# Patient Record
Sex: FEMALE | URBAN - NONMETROPOLITAN AREA
[De-identification: names, ages, dates, MRNs, and addresses within clinical notes are randomized per-mention and may not be internally consistent; named-entity substitution may affect disease eponyms.]

---

## 2024-09-06 ENCOUNTER — OFFICE VISIT (OUTPATIENT)
Dept: URBAN - NONMETROPOLITAN AREA CLINIC 13 | Facility: CLINIC | Age: 74
End: 2024-09-06
Payer: MEDICARE

## 2024-09-06 ENCOUNTER — DASHBOARD ENCOUNTERS (OUTPATIENT)
Age: 74
End: 2024-09-06

## 2024-09-06 VITALS
WEIGHT: 196 LBS | HEIGHT: 64 IN | SYSTOLIC BLOOD PRESSURE: 107 MMHG | BODY MASS INDEX: 33.46 KG/M2 | DIASTOLIC BLOOD PRESSURE: 65 MMHG | HEART RATE: 62 BPM

## 2024-09-06 DIAGNOSIS — R11.0 NAUSEA: ICD-10-CM

## 2024-09-06 DIAGNOSIS — Z86.010 ADENOMAS PERSONAL HISTORY OF COLONIC POLYPS: ICD-10-CM

## 2024-09-06 DIAGNOSIS — K44.9 HIATAL HERNIA: ICD-10-CM

## 2024-09-06 DIAGNOSIS — K57.30 ACQUIRED DIVERTICULOSIS OF COLON: ICD-10-CM

## 2024-09-06 DIAGNOSIS — K21.9 GASTROESOPHAGEAL REFLUX DISEASE, UNSPECIFIED WHETHER ESOPHAGITIS PRESENT: ICD-10-CM

## 2024-09-06 DIAGNOSIS — Z90.49 H/O PARTIAL RESECTION OF COLON: ICD-10-CM

## 2024-09-06 PROCEDURE — 99204 OFFICE O/P NEW MOD 45 MIN: CPT

## 2024-09-06 RX ORDER — LEVOTHYROXINE SODIUM 0.1 MG/1
TAKE 1 TABLET BY MOUTH IN THE MORNING TABLET ORAL
Qty: 90 EACH | Refills: 0 | Status: ACTIVE | COMMUNITY

## 2024-09-06 RX ORDER — MIRABEGRON 50 MG/1
TAKE 1 TABLET BY MOUTH ONCE DAILY FOR URGENCY TABLET, FILM COATED, EXTENDED RELEASE ORAL
Qty: 30 EACH | Refills: 0 | Status: ACTIVE | COMMUNITY

## 2024-09-06 RX ORDER — OXYBUTYNIN CHLORIDE 5 MG/1
TABLET ORAL
Qty: 90 TABLET | Status: ACTIVE | COMMUNITY

## 2024-09-06 RX ORDER — PANTOPRAZOLE SODIUM 40 MG/1
TAKE 1 TABLET BY MOUTH IN THE MORNING TABLET, DELAYED RELEASE ORAL
Qty: 90 EACH | Refills: 0 | Status: ACTIVE | COMMUNITY

## 2024-09-06 RX ORDER — SUCRALFATE 1 G/1
TABLET ORAL
Qty: 180 TABLET | Status: ACTIVE | COMMUNITY

## 2024-09-06 RX ORDER — ESCITALOPRAM 5 MG/1
TAKE 1 TABLET BY MOUTH IN THE MORNING TABLET, FILM COATED ORAL
Qty: 90 EACH | Refills: 0 | Status: ACTIVE | COMMUNITY

## 2024-09-06 RX ORDER — POTASSIUM CHLORIDE 1500 MG/1
TAKE 1 TABLET BY MOUTH IN THE MORNING TABLET, EXTENDED RELEASE ORAL
Qty: 90 EACH | Refills: 0 | Status: ACTIVE | COMMUNITY

## 2024-09-06 RX ORDER — CALCIPOTRIENE 50 UG/G
APPLY CREAM TOPICALLY TWICE DAILY CREAM TOPICAL
Qty: 60 GRAM | Refills: 0 | Status: ACTIVE | COMMUNITY

## 2024-09-06 NOTE — HPI-TODAY'S VISIT:
9/6/2024 Ms. Joseph presents for evaluation of a history of hiatal hernia and abnormal findings on 5/2024 CT imaging.  This was completed at an outside location and noted focal area of bowel wall thickening in the cecum, noted internal hemorrhoids.  She then underwent colonoscopy with Dr. Mau Knutson on 6/2024 with no concerning findings for colon cancer or inflammation. She has a history of surgery for her hiatal hernia with a redo in 2023 with Dr. Egan at [place].  Her CT noted a moderate hiatal hernia and she is questioning whether this is intact.  She also notes a history of colon resection for diverticulitis completed in [place] in 2019. Incidentally her CT noted possible liver hemangiomas and pancreatic atrophy. She states she has been prescribed Creon in the past for exocrine pancreatic insufficiency however this gave her frequent loose stools.  After she discontinued she felt better and had more formed stools. Today she denies any complaints from an upper GI standpoint other than recurrent nausea in the morning when she first wakes up.  She does take pantoprazole 40 mg daily in the morning for GERD.  She denies dysphagia, reflux or heartburn.  She denies any episodes of chest pain. She reports mixed bowel habits with some days of constipation and some days of looser stool.  She does occasionally have abdominal discomfort that is relieved by bowel movements. She denies any unintentional weight loss, blood in her stool.  She does have a family history of colon cancer and is seeking a second opinion given the findings on her CT scan.  She would also like further evaluation for of her hiatal hernia. She is seeing [facility] currently for workup for palpitations.  She is interested in establishing for regular GI management here in [place].  Denies any other complaints today and is otherwise doing well. SP

## 2024-09-11 ENCOUNTER — OFFICE VISIT (OUTPATIENT)
Dept: URBAN - NONMETROPOLITAN AREA CLINIC 2 | Facility: CLINIC | Age: 74
End: 2024-09-11

## 2024-09-17 ENCOUNTER — TELEPHONE ENCOUNTER (OUTPATIENT)
Dept: URBAN - NONMETROPOLITAN AREA CLINIC 2 | Facility: CLINIC | Age: 74
End: 2024-09-17

## 2024-09-22 ENCOUNTER — P2P PATIENT RECORD (OUTPATIENT)
Age: 74
End: 2024-09-22

## 2024-09-25 PROBLEM — 235595009: Status: ACTIVE | Noted: 2024-09-25

## 2024-09-25 PROBLEM — 84089009: Status: ACTIVE | Noted: 2024-09-25

## 2024-09-25 PROBLEM — 422587007: Status: ACTIVE | Noted: 2024-09-25

## 2024-09-25 PROBLEM — 428305005: Status: ACTIVE | Noted: 2024-09-25

## 2024-09-25 PROBLEM — 305058001: Status: ACTIVE | Noted: 2024-09-25

## 2024-09-25 PROBLEM — 441988000: Status: ACTIVE | Noted: 2024-09-25

## 2024-09-25 PROBLEM — 397881000: Status: ACTIVE | Noted: 2024-09-25

## 2024-12-13 ENCOUNTER — OFFICE VISIT (OUTPATIENT)
Dept: URBAN - NONMETROPOLITAN AREA CLINIC 13 | Facility: CLINIC | Age: 74
End: 2024-12-13

## 2025-02-07 ENCOUNTER — OFFICE VISIT (OUTPATIENT)
Dept: URBAN - NONMETROPOLITAN AREA CLINIC 13 | Facility: CLINIC | Age: 75
End: 2025-02-07

## 2025-07-03 ENCOUNTER — P2P PATIENT RECORD (OUTPATIENT)
Age: 75
End: 2025-07-03